# Patient Record
Sex: FEMALE | Race: WHITE | URBAN - NONMETROPOLITAN AREA
[De-identification: names, ages, dates, MRNs, and addresses within clinical notes are randomized per-mention and may not be internally consistent; named-entity substitution may affect disease eponyms.]

---

## 2019-06-19 ENCOUNTER — OFFICE VISIT (OUTPATIENT)
Dept: SURGERY | Age: 34
End: 2019-06-19
Payer: COMMERCIAL

## 2019-06-19 VITALS
BODY MASS INDEX: 30.05 KG/M2 | TEMPERATURE: 98 F | SYSTOLIC BLOOD PRESSURE: 120 MMHG | DIASTOLIC BLOOD PRESSURE: 74 MMHG | HEIGHT: 64 IN | HEART RATE: 87 BPM | WEIGHT: 176 LBS

## 2019-06-19 DIAGNOSIS — K80.20 CALCULUS OF GALLBLADDER WITHOUT CHOLECYSTITIS WITHOUT OBSTRUCTION: Primary | ICD-10-CM

## 2019-06-19 DIAGNOSIS — M54.50 LUMBAR BACK PAIN: ICD-10-CM

## 2019-06-19 PROCEDURE — 99213 OFFICE O/P EST LOW 20 MIN: CPT | Performed by: SURGERY

## 2019-06-19 SDOH — HEALTH STABILITY: MENTAL HEALTH: HOW OFTEN DO YOU HAVE A DRINK CONTAINING ALCOHOL?: NEVER

## 2019-06-19 ASSESSMENT — ENCOUNTER SYMPTOMS
COUGH: 0
WHEEZING: 0
SORE THROAT: 0
DIARRHEA: 0
BACK PAIN: 1
CHOKING: 0
VOICE CHANGE: 0
NAUSEA: 1
CONSTIPATION: 1
ABDOMINAL PAIN: 1
VOMITING: 1
CHEST TIGHTNESS: 0
TROUBLE SWALLOWING: 0
HEMATOCHEZIA: 0
ABDOMINAL DISTENTION: 1
SHORTNESS OF BREATH: 0

## 2019-06-19 ASSESSMENT — CROHNS DISEASE ACTIVITY INDEX (CDAI): CDAI SCORE: 0

## 2019-06-19 NOTE — PROGRESS NOTES
Subjective:      Patient ID: Raul Dandy is a 28 y.o. female. Abdominal Pain   This is a new problem. The most recent episode lasted 10 hours. The problem has been resolved. The pain is located in the RUQ. The quality of the pain is burning and sharp. The abdominal pain radiates to the back. Associated symptoms include anorexia, constipation, nausea, vomiting and weight loss ( deliberate weight loss of about 60 lbs over the past year). Pertinent negatives include no diarrhea, dysuria, fever, frequency, headaches, hematochezia, hematuria or melena. The pain is aggravated by eating. The pain is relieved by nothing. Treatments tried: ibuprofen, heating pad. The treatment provided mild relief. Prior diagnostic workup includes CT scan. Her past medical history is significant for gallstones. There is no history of Crohn's disease, GERD, irritable bowel syndrome, pancreatitis, PUD or ulcerative colitis. Started with low back pain. Back pain is the primary symptom. Past Medical History:   Diagnosis Date    Anxiety      Past Surgical History:   Procedure Laterality Date    LAPAROSCOPY      age 12-ovary cyst    TYMPANOSTOMY TUBE PLACEMENT       No current outpatient medications on file. No current facility-administered medications for this visit. No Known Allergies  Social History     Tobacco Use    Smoking status: Current Every Day Smoker    Smokeless tobacco: Never Used   Substance Use Topics    Alcohol use: Never     Frequency: Never    Drug use: Never     Family History   Problem Relation Age of Onset    Cancer Paternal Grandfather         lung cancer, m.s     Review of Systems   Constitutional: Positive for weight loss ( deliberate weight loss of about 60 lbs over the past year). Negative for fatigue, fever and unexpected weight change. HENT: Negative for sore throat, trouble swallowing and voice change.     Respiratory: Negative for cough, choking, chest tightness, shortness of breath and cervical adenopathy. Neurological: She is alert and oriented to person, place, and time. Skin: Skin is warm and dry. She is not diaphoretic. Psychiatric: She has a normal mood and affect. Her behavior is normal. Judgment and thought content normal.     CT - cholelithiasis  Lab work was okay    Assessment:      1) Cholelithiasis - She has had 2 episodes of fairly severe back pain, radiating into her epigastric area,  Last on associated with nausea and dry heaves. She also has some sporadic vague abdominal discomfort. It is possible the stones are the source of her pain, but it is also possible that her stones are incidental findings. Not all gall stones cause problems. Other possibilities include musculoskeletal back pain, PUD, GERD, gastritis etc.    We discussed the natural history of symptomatic and asymptomatic stones. Plan:      1) Laparoscopic Cholecystectomy - Mary Gomez has symptomatic cholelithiasis. Alternative treatments of long term low fat diet and oral dissolution therapy were discussed. Risks of the operation were discussed with her in detail. These risks include: conversion to the open operation (done for safety when necessary), bleeding, infection, injury to other intra-abdominal organs like the small intestine, and failure for symptoms to resolve (post-cholecystectomy syndrome). I also discussed the possibility of developing diarrhea due to gall bladder removal.  Common bile duct injury and its consequences, along with bile leak and retained common bile duct stone were also reviewed. Additionally, Mary Gomez was given the opportunity to ask questions and clarifications. she does want to proceed at this time. She and her  spent some time in the office thinking about the options prior to deciding to proceed.         Maty Coombs MD

## 2019-07-10 ENCOUNTER — OFFICE VISIT (OUTPATIENT)
Dept: SURGERY | Age: 34
End: 2019-07-10

## 2019-07-10 VITALS
HEIGHT: 64 IN | TEMPERATURE: 98.3 F | WEIGHT: 180 LBS | SYSTOLIC BLOOD PRESSURE: 120 MMHG | BODY MASS INDEX: 30.73 KG/M2 | HEART RATE: 71 BPM | DIASTOLIC BLOOD PRESSURE: 78 MMHG

## 2019-07-10 DIAGNOSIS — K80.20 CALCULUS OF GALLBLADDER WITHOUT CHOLECYSTITIS WITHOUT OBSTRUCTION: Primary | ICD-10-CM

## 2019-07-10 PROCEDURE — 99024 POSTOP FOLLOW-UP VISIT: CPT | Performed by: SURGERY

## 2019-07-10 RX ORDER — HYDROCODONE BITARTRATE AND ACETAMINOPHEN 5; 325 MG/1; MG/1
1 TABLET ORAL
COMMUNITY
Start: 2019-07-03 | End: 2019-07-10